# Patient Record
Sex: FEMALE | Race: WHITE | URBAN - METROPOLITAN AREA
[De-identification: names, ages, dates, MRNs, and addresses within clinical notes are randomized per-mention and may not be internally consistent; named-entity substitution may affect disease eponyms.]

---

## 2022-12-22 ENCOUNTER — OFFICE VISIT (OUTPATIENT)
Dept: URBAN - METROPOLITAN AREA CLINIC 24 | Facility: CLINIC | Age: 57
End: 2022-12-22
Payer: COMMERCIAL

## 2022-12-22 DIAGNOSIS — H43.813 VITREOUS DEGENERATION, BILATERAL: Primary | ICD-10-CM

## 2022-12-22 DIAGNOSIS — H33.021 RETINAL DETACHMENT WITH MULTIPLE BREAKS, RIGHT EYE: ICD-10-CM

## 2022-12-22 DIAGNOSIS — H25.13 AGE-RELATED NUCLEAR CATARACT, BILATERAL: ICD-10-CM

## 2022-12-22 PROCEDURE — 99204 OFFICE O/P NEW MOD 45 MIN: CPT | Performed by: OPHTHALMOLOGY

## 2022-12-22 PROCEDURE — 92134 CPTRZ OPH DX IMG PST SGM RTA: CPT | Performed by: OPHTHALMOLOGY

## 2022-12-22 ASSESSMENT — INTRAOCULAR PRESSURE
OD: 13
OS: 12

## 2022-12-22 NOTE — IMPRESSION/PLAN
Impression: Retinal detach Mac on OD Seen w Dr. Daphney Loza for [address]: RIGHT eye Mac on RD --  Detailed specialty exam of retina (incl scleral depressed exam) confirms retinal detachment (RD). Prompt Re-attachement surgery indicated and will be arranged. Even w successful surgery, risk of progressive or recur RD is est. 10-15%. URGED pt return immediately for repeat retinal eval if progressive/new symptoms (floaters, photopsias, field loss, etc). Vision will not recover to NML even w successful re-attachment. In some cases (1-5% est.)  retina NEVER re-attaches despite best efforts. Extensive education. All questions answered /understood. Proceed w RD repair surgx. ASAP mac on -- goal <24h PLAN VIT / Laser / OIL -- LIMB-THREATENING condition Post op plan [facility] -- [place] [address] [place]    Future will need VIT / OIL Removal.

## 2022-12-22 NOTE — IMPRESSION/PLAN
Impression: Age-related nuclear cataract, bilateral: H25.13. Plan: NOTE:  Pt understands that cataract will progress after vitrectomy surgery. May be in only few wks/mo, but can be delayed by over a year. Will address cataract thereafter w primary eye team PRN.

## 2022-12-27 ENCOUNTER — OFFICE VISIT (OUTPATIENT)
Dept: URBAN - METROPOLITAN AREA CLINIC 64 | Facility: CLINIC | Age: 57
End: 2022-12-27
Payer: COMMERCIAL

## 2022-12-27 DIAGNOSIS — H44.19 OTHER ENDOPHTHALMITIS: Primary | ICD-10-CM

## 2022-12-27 DIAGNOSIS — H33.021 RETINAL DETACHMENT WITH MULTIPLE BREAKS, RIGHT EYE: ICD-10-CM

## 2022-12-27 PROCEDURE — 99215 OFFICE O/P EST HI 40 MIN: CPT | Performed by: STUDENT IN AN ORGANIZED HEALTH CARE EDUCATION/TRAINING PROGRAM

## 2022-12-27 ASSESSMENT — INTRAOCULAR PRESSURE
OD: 25
OS: 14
OD: 29

## 2022-12-27 NOTE — IMPRESSION/PLAN
Impression: Other endophthalmitis: H44.19. Plan: s/p PPV for Mac on RD with SO fill on 12/23/22
layered hypopyon, interface haze throughout LASIK flap Emergent, refer to retina provider on call for evaluation today/surgery today. Discussed guarded prognosis with patient.

## 2022-12-27 NOTE — IMPRESSION/PLAN
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 Plan: s/p PPV, no view to posterior segment today due to overlying cornea, AC. Refer to retina.

## 2022-12-28 ENCOUNTER — POST-OPERATIVE VISIT (OUTPATIENT)
Dept: URBAN - METROPOLITAN AREA CLINIC 10 | Facility: CLINIC | Age: 57
End: 2022-12-28
Payer: COMMERCIAL

## 2022-12-28 DIAGNOSIS — Z48.810 ENCOUNTER FOR SURGICAL AFTERCARE FOLLOWING SURGERY ON A SENSE ORGAN: Primary | ICD-10-CM

## 2022-12-28 PROCEDURE — 99024 POSTOP FOLLOW-UP VISIT: CPT | Performed by: OPTOMETRIST

## 2022-12-28 RX ORDER — OFLOXACIN 3 MG/ML
0.3 % SOLUTION/ DROPS OPHTHALMIC
Qty: 1 | Refills: 1 | Status: ACTIVE
Start: 2022-12-28

## 2022-12-28 RX ORDER — PREDNISOLONE ACETATE 10 MG/ML
1 % SUSPENSION/ DROPS OPHTHALMIC
Qty: 1 | Refills: 1 | Status: ACTIVE
Start: 2022-12-28

## 2022-12-28 ASSESSMENT — INTRAOCULAR PRESSURE: OD: 16

## 2022-12-28 NOTE — IMPRESSION/PLAN
Impression: S/P Posterior Vitrectomy (PPVIT); Oil Removal 1K; Complex Cataract Extraction; Laser; Gas OD - 1 Day. Encounter for surgical aftercare following surgery on a sense organ  Z48.810. Plan: RTC 3 days to confirm [place]. Ofloxacin TID x 1 wk, Pred TID with weekly taper May start Atropine QD

## 2022-12-28 NOTE — IMPRESSION/PLAN
Impression:  Encounter for surgical aftercare following surgery on a sense organ  Z48.810. Excellent post op course   Post operative instructions reviewed - Plan: RTC as scheduled. Ofloxacin TID x 1 wk, Pred TID with weekly taper May start Atropine QD

## 2023-01-03 ENCOUNTER — POST-OPERATIVE VISIT (OUTPATIENT)
Dept: URBAN - METROPOLITAN AREA CLINIC 10 | Facility: CLINIC | Age: 58
End: 2023-01-03
Payer: COMMERCIAL

## 2023-01-03 DIAGNOSIS — Z48.810 ENCOUNTER FOR SURGICAL AFTERCARE FOLLOWING SURGERY ON A SENSE ORGAN: Primary | ICD-10-CM

## 2023-01-03 PROCEDURE — 99024 POSTOP FOLLOW-UP VISIT: CPT | Performed by: OPTOMETRIST

## 2023-01-03 ASSESSMENT — INTRAOCULAR PRESSURE
OS: 11
OD: 6

## 2023-01-06 ENCOUNTER — POST-OPERATIVE VISIT (OUTPATIENT)
Dept: URBAN - METROPOLITAN AREA CLINIC 10 | Facility: CLINIC | Age: 58
End: 2023-01-06
Payer: COMMERCIAL

## 2023-01-06 PROCEDURE — 99024 POSTOP FOLLOW-UP VISIT: CPT | Performed by: OPTOMETRIST

## 2023-01-06 ASSESSMENT — INTRAOCULAR PRESSURE
OD: 10
OS: 9

## 2023-01-06 NOTE — IMPRESSION/PLAN
Impression:  Encounter for surgical aftercare following surgery on a sense organ  Z48.810. Plan: Doing well. Gas bubble gone. AC clearing. No hypopyon. Pt. returning to [employer]. Cont. pred tid OD. RTC as scheduled next week c retina, Dr. Curtis Gruber as scheduled.

## 2023-01-10 ENCOUNTER — OFFICE VISIT (OUTPATIENT)
Dept: URBAN - METROPOLITAN AREA CLINIC 64 | Facility: CLINIC | Age: 58
End: 2023-01-10
Payer: COMMERCIAL

## 2023-01-10 DIAGNOSIS — H16.011 CORNEAL CORNEAL ULCER OF RIGHT EYE: ICD-10-CM

## 2023-01-10 DIAGNOSIS — H33.021 RETINAL DETACHMENT WITH MULTIPLE BREAKS, RIGHT EYE: Primary | ICD-10-CM

## 2023-01-10 PROCEDURE — 92134 CPTRZ OPH DX IMG PST SGM RTA: CPT | Performed by: OPHTHALMOLOGY

## 2023-01-10 PROCEDURE — 99024 POSTOP FOLLOW-UP VISIT: CPT | Performed by: OPHTHALMOLOGY

## 2023-01-10 ASSESSMENT — INTRAOCULAR PRESSURE
OS: 11
OD: 9

## 2023-01-10 NOTE — IMPRESSION/PLAN
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 Plan: Seemingly attached, gas bubble appears resolved. Doing well.

## 2023-01-11 ENCOUNTER — OFFICE VISIT (OUTPATIENT)
Dept: URBAN - METROPOLITAN AREA CLINIC 10 | Facility: CLINIC | Age: 58
End: 2023-01-11
Payer: COMMERCIAL

## 2023-01-11 DIAGNOSIS — H16.121 FILAMENTARY KERATITIS, RIGHT EYE: ICD-10-CM

## 2023-01-11 DIAGNOSIS — H18.231 SECONDARY CORNEAL EDEMA, RIGHT EYE: Primary | ICD-10-CM

## 2023-01-11 PROCEDURE — 99214 OFFICE O/P EST MOD 30 MIN: CPT | Performed by: OPHTHALMOLOGY

## 2023-01-11 NOTE — IMPRESSION/PLAN
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 Plan: Reassured pt. Very mild. Increase pred to TID weekly taper. 
Start Brian anjana qhs.

## 2023-01-11 NOTE — IMPRESSION/PLAN
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 Plan: Filaments debrided with Q-tip and forceps. No ulcer seen, large mucous filament attached to cornea and loose suture- removed. Start PFATs ATC Use oflox TID x 1 week

## 2023-01-11 NOTE — IMPRESSION/PLAN
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  Plan: s/p repair, under the care of Juju Clemente

## 2023-01-17 ENCOUNTER — OFFICE VISIT (OUTPATIENT)
Dept: URBAN - METROPOLITAN AREA CLINIC 64 | Facility: CLINIC | Age: 58
End: 2023-01-17
Payer: COMMERCIAL

## 2023-01-17 DIAGNOSIS — H18.231 SECONDARY CORNEAL EDEMA, RIGHT EYE: ICD-10-CM

## 2023-01-17 DIAGNOSIS — H33.021 RETINAL DETACHMENT WITH MULTIPLE BREAKS, RIGHT EYE: ICD-10-CM

## 2023-01-17 DIAGNOSIS — H16.121 FILAMENTARY KERATITIS, RIGHT EYE: Primary | ICD-10-CM

## 2023-01-17 PROCEDURE — 99213 OFFICE O/P EST LOW 20 MIN: CPT

## 2023-01-17 RX ORDER — PREDNISOLONE ACETATE 10 MG/ML
1 % SUSPENSION/ DROPS OPHTHALMIC
Qty: 1 | Refills: 1 | Status: ACTIVE
Start: 2023-01-17

## 2023-01-17 ASSESSMENT — INTRAOCULAR PRESSURE: OD: 7

## 2023-01-17 NOTE — IMPRESSION/PLAN
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 Plan: Continue Brian 128 BID OD, Will start predforte QID OD x1 week then taper 3,2,1 weekly. Edema appears the same since CO with Dr. Maricarmen Cary.  will have patient continue care with . RTC for cornea FU 2 weeks.

## 2023-01-17 NOTE — IMPRESSION/PLAN
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 Plan: Small conj cyst OD, assured patient. Continue PFATs q1hr - see other plan.

## 2023-01-17 NOTE — IMPRESSION/PLAN
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 Plan: Appears attached but hazy view due to corneal edema. FU as scheduled with Dr. Sunitha Thurman.

## 2023-01-30 ENCOUNTER — OFFICE VISIT (OUTPATIENT)
Dept: URBAN - METROPOLITAN AREA CLINIC 64 | Facility: CLINIC | Age: 58
End: 2023-01-30
Payer: COMMERCIAL

## 2023-01-30 DIAGNOSIS — H33.021 RETINAL DETACHMENT WITH MULTIPLE BREAKS, RIGHT EYE: ICD-10-CM

## 2023-01-30 DIAGNOSIS — H16.011 CORNEAL CORNEAL ULCER OF RIGHT EYE: Primary | ICD-10-CM

## 2023-01-30 PROCEDURE — 92134 CPTRZ OPH DX IMG PST SGM RTA: CPT | Performed by: OPHTHALMOLOGY

## 2023-01-30 PROCEDURE — 99214 OFFICE O/P EST MOD 30 MIN: CPT | Performed by: OPHTHALMOLOGY

## 2023-01-30 ASSESSMENT — INTRAOCULAR PRESSURE
OD: 12
OS: 17

## 2023-01-30 NOTE — IMPRESSION/PLAN
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 Plan: Continue f/u w/ Dr Samantha Louis on Thursday and Dr Alvaro Meneses and Dr Gala Adhikari as scheduled.  CPM.

## 2023-01-30 NOTE — IMPRESSION/PLAN
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 Plan: Stable, observe. Doing well. RTC 4 weeks, hopefully the view will improve somewhat by then but retina appears well-attached at this time.

## 2023-02-02 ENCOUNTER — OFFICE VISIT (OUTPATIENT)
Dept: URBAN - METROPOLITAN AREA CLINIC 64 | Facility: CLINIC | Age: 58
End: 2023-02-02
Payer: COMMERCIAL

## 2023-02-02 DIAGNOSIS — H44.19 OTHER ENDOPHTHALMITIS: ICD-10-CM

## 2023-02-02 DIAGNOSIS — H18.231 SECONDARY CORNEAL EDEMA, RIGHT EYE: Primary | ICD-10-CM

## 2023-02-02 PROCEDURE — 99213 OFFICE O/P EST LOW 20 MIN: CPT | Performed by: STUDENT IN AN ORGANIZED HEALTH CARE EDUCATION/TRAINING PROGRAM

## 2023-02-02 ASSESSMENT — INTRAOCULAR PRESSURE
OS: 13
OD: 12

## 2023-02-02 NOTE — IMPRESSION/PLAN
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 Plan: LASIK flap with superior circumferential haze, ? post inflammatory changes/scarring
-No apparent ulcer or infiltrate
-Rec Continuing Brian 128 increase to BID
-Continue Ofloxacin TID
-Continue pF taper over this next month Follow up 1 week for any changes

## 2023-02-02 NOTE — IMPRESSION/PLAN
Impression: Other endophthalmitis: H44.19.  Plan: Resolved s/p SO removal with intra Vit Abx
-Continue all care with DR Curran Sep today appears attached but poor view due to K

## 2023-02-07 ENCOUNTER — OFFICE VISIT (OUTPATIENT)
Dept: URBAN - METROPOLITAN AREA CLINIC 64 | Facility: CLINIC | Age: 58
End: 2023-02-07
Payer: COMMERCIAL

## 2023-02-07 DIAGNOSIS — H18.231 SECONDARY CORNEAL EDEMA, RIGHT EYE: Primary | ICD-10-CM

## 2023-02-07 PROCEDURE — 99024 POSTOP FOLLOW-UP VISIT: CPT | Performed by: STUDENT IN AN ORGANIZED HEALTH CARE EDUCATION/TRAINING PROGRAM

## 2023-02-07 ASSESSMENT — INTRAOCULAR PRESSURE: OS: 15

## 2023-02-07 NOTE — IMPRESSION/PLAN
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 Plan: LASIK flap with superior circumferential haze, ? post inflammatory changes/scarring
-No apparent ulcer or infiltrate
-Continue Brian 128 BID 
-Stop Ofloxacin TID
-Continue pF taper over this next month Follow up 1 week for any changes

## 2023-02-17 ENCOUNTER — OFFICE VISIT (OUTPATIENT)
Dept: URBAN - METROPOLITAN AREA CLINIC 64 | Facility: CLINIC | Age: 58
End: 2023-02-17
Payer: COMMERCIAL

## 2023-02-17 DIAGNOSIS — H18.231 SECONDARY CORNEAL EDEMA, RIGHT EYE: Primary | ICD-10-CM

## 2023-02-17 PROCEDURE — 99024 POSTOP FOLLOW-UP VISIT: CPT | Performed by: STUDENT IN AN ORGANIZED HEALTH CARE EDUCATION/TRAINING PROGRAM

## 2023-02-17 RX ORDER — ERYTHROMYCIN 5 MG/G
OINTMENT OPHTHALMIC
Qty: 1 | Refills: 1 | Status: ACTIVE
Start: 2023-02-17

## 2023-02-17 ASSESSMENT — INTRAOCULAR PRESSURE: OS: 14

## 2023-02-17 NOTE — IMPRESSION/PLAN
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 Plan: LASIK flap with superior circumferential haze, ? post inflammatory changes/scarring
-No apparent ulcer or infiltrate
-DC Brian 128 BID 
-Continue pF taper over this next month, currently BID OD
-filaments removed at slit lamp
-RX erythromycin anjana TID to Q1hr OD Follow up 1 week for any changes

## 2023-02-24 ENCOUNTER — OFFICE VISIT (OUTPATIENT)
Dept: URBAN - METROPOLITAN AREA CLINIC 64 | Facility: CLINIC | Age: 58
End: 2023-02-24
Payer: COMMERCIAL

## 2023-02-24 DIAGNOSIS — H18.231 SECONDARY CORNEAL EDEMA, RIGHT EYE: Primary | ICD-10-CM

## 2023-02-24 PROCEDURE — 99024 POSTOP FOLLOW-UP VISIT: CPT

## 2023-02-24 ASSESSMENT — INTRAOCULAR PRESSURE
OS: 12
OD: 8

## 2023-02-24 NOTE — IMPRESSION/PLAN
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 Plan: LASIK flap with superior circumferential haze. Continue PF taper, QD OD and erythromycin anjana TID to Q1hr ODx 1 week, then DC. No apparent ulcer or infiltrate. Follow up 1 month for any changes with Dr. Nakita Musa.

## 2023-02-27 ENCOUNTER — OFFICE VISIT (OUTPATIENT)
Dept: URBAN - METROPOLITAN AREA CLINIC 64 | Facility: CLINIC | Age: 58
End: 2023-02-27
Payer: COMMERCIAL

## 2023-02-27 DIAGNOSIS — H33.021 RETINAL DETACHMENT WITH MULTIPLE BREAKS, RIGHT EYE: Primary | ICD-10-CM

## 2023-02-27 PROCEDURE — 92134 CPTRZ OPH DX IMG PST SGM RTA: CPT | Performed by: OPHTHALMOLOGY

## 2023-02-27 PROCEDURE — 99024 POSTOP FOLLOW-UP VISIT: CPT | Performed by: OPHTHALMOLOGY

## 2023-02-27 ASSESSMENT — INTRAOCULAR PRESSURE
OD: 9
OS: 11

## 2023-02-27 NOTE — IMPRESSION/PLAN
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 Plan: Stable, observe. Doing well. View into posterior segment much improved. RD precautions discussed. PRN with me. Continue f/u w/ anterior segment docs going forward.

## 2023-03-24 ENCOUNTER — OFFICE VISIT (OUTPATIENT)
Dept: URBAN - METROPOLITAN AREA CLINIC 64 | Facility: CLINIC | Age: 58
End: 2023-03-24
Payer: COMMERCIAL

## 2023-03-24 DIAGNOSIS — H44.19 OTHER ENDOPHTHALMITIS: ICD-10-CM

## 2023-03-24 DIAGNOSIS — H17.11 CENTRAL CORNEAL OPACITY OF RIGHT EYE: Primary | ICD-10-CM

## 2023-03-24 PROCEDURE — 99024 POSTOP FOLLOW-UP VISIT: CPT | Performed by: STUDENT IN AN ORGANIZED HEALTH CARE EDUCATION/TRAINING PROGRAM

## 2023-03-24 RX ORDER — PREDNISOLONE ACETATE 10 MG/ML
1 % SUSPENSION/ DROPS OPHTHALMIC
Qty: 1 | Refills: 1 | Status: ACTIVE
Start: 2023-03-24

## 2023-03-24 ASSESSMENT — INTRAOCULAR PRESSURE
OD: 8
OS: 11

## 2023-03-24 NOTE — IMPRESSION/PLAN
Impression: Other endophthalmitis: H44.19. Plan: Resolved s/p SO removal with intra Vit Abx
-Continue all care with DR Hummel Ou

## 2023-03-24 NOTE — IMPRESSION/PLAN
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 Plan: s/p endophthalmitis and possible K infiltrate, now appears resolved but may have some inflammatory debris in the interface
-Trial of repeat prednisolone acetate 4/3/2/1 over the next month and follow up after wards
-may need a flap lift there after if no imprvoement 
-Trial of contact lens at outside doc office without improvement

## 2023-04-27 ENCOUNTER — OFFICE VISIT (OUTPATIENT)
Dept: URBAN - METROPOLITAN AREA CLINIC 64 | Facility: CLINIC | Age: 58
End: 2023-04-27
Payer: COMMERCIAL

## 2023-04-27 DIAGNOSIS — H17.11 CENTRAL CORNEAL OPACITY OF RIGHT EYE: Primary | ICD-10-CM

## 2023-04-27 DIAGNOSIS — H44.19 OTHER ENDOPHTHALMITIS: ICD-10-CM

## 2023-04-27 PROCEDURE — 99213 OFFICE O/P EST LOW 20 MIN: CPT | Performed by: STUDENT IN AN ORGANIZED HEALTH CARE EDUCATION/TRAINING PROGRAM

## 2023-04-27 ASSESSMENT — INTRAOCULAR PRESSURE
OD: 8
OS: 11

## 2023-04-27 NOTE — IMPRESSION/PLAN
Impression: Other endophthalmitis: H44.19.  Plan: Resolved s/p SO removal with intra Vit Abx
-Continue all care with DR Vernon Farah

## 2023-04-27 NOTE — IMPRESSION/PLAN
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  Plan: s/p endophthalmitis and possible K infiltrate, now appears resolved but may have some inflammatory debris in the interface
-Trial of repeat prednisolone acetate 4/3/2/1 without improvement --> refer back to Dr Penn Lute again for further eval
-Would not recommend flap lift with level of KNV

## 2023-05-30 ENCOUNTER — OFFICE VISIT (OUTPATIENT)
Dept: URBAN - METROPOLITAN AREA CLINIC 10 | Facility: CLINIC | Age: 58
End: 2023-05-30
Payer: COMMERCIAL

## 2023-05-30 DIAGNOSIS — H20.11 CHRONIC IRIDOCYCLITIS, RIGHT EYE: ICD-10-CM

## 2023-05-30 DIAGNOSIS — H17.11 CENTRAL CORNEAL OPACITY OF RIGHT EYE: ICD-10-CM

## 2023-05-30 DIAGNOSIS — H18.231 SECONDARY CORNEAL EDEMA, RIGHT EYE: ICD-10-CM

## 2023-05-30 DIAGNOSIS — H16.141 PUNCTATE KERATITIS, RIGHT EYE: Primary | ICD-10-CM

## 2023-05-30 PROCEDURE — 99213 OFFICE O/P EST LOW 20 MIN: CPT | Performed by: OPHTHALMOLOGY

## 2023-05-30 RX ORDER — DIFLUPREDNATE OPHTHALMIC 0.5 MG/ML
0.05 % EMULSION OPHTHALMIC
Qty: 5 | Refills: 3 | Status: ACTIVE
Start: 2023-05-30

## 2023-05-30 ASSESSMENT — INTRAOCULAR PRESSURE
OS: 11
OD: 9

## 2023-05-30 NOTE — IMPRESSION/PLAN
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 Plan: Stable edema; explained to pt that corneal transplantation unlikely to improve vision. No corneal surgery recommended.

## 2023-05-30 NOTE — IMPRESSION/PLAN
Impression: Punctate keratitis, right eye: H16.141.
- Several risk factors for keratitis including persistent inflammation. h/o LASIK, several surgeries. - PP in place RUL and RLL 0.4 ultraplug Plan: Much improved! Filaments resolved Also with persistent AC rxn noted. Explained to pt that goal of ocular surface tx is to optimize comfort, visual potential guarded. Cont PFATs q1hr w/a Cont Erythro anjana BID. Cont ASEDs 20% QID. Cont LS/WC.

## 2023-05-30 NOTE — IMPRESSION/PLAN
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 Plan: No resolution on several rounds of prednisolone --  needs a stronger steroid gtt. Lotemax /FML/ Flarex would not be strong enough. Start Difluprednate or dexamethasone QID x 2 weeks then TID x 2 weeks then BID, hold at this dose OD.

## 2023-05-30 NOTE — IMPRESSION/PLAN
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  Plan: Renu of epi in growth noted, would not recommend flap lift as it is unlikely to be causing diffuse keratitis of the entire cornea and will likely not improve vision

## 2023-06-27 ENCOUNTER — OFFICE VISIT (OUTPATIENT)
Dept: URBAN - METROPOLITAN AREA CLINIC 10 | Facility: CLINIC | Age: 58
End: 2023-06-27
Payer: COMMERCIAL

## 2023-06-27 DIAGNOSIS — H16.141 PUNCTATE KERATITIS, RIGHT EYE: Primary | ICD-10-CM

## 2023-06-27 DIAGNOSIS — H44.19 OTHER ENDOPHTHALMITIS: ICD-10-CM

## 2023-06-27 DIAGNOSIS — H20.11 CHRONIC IRIDOCYCLITIS, RIGHT EYE: ICD-10-CM

## 2023-06-27 DIAGNOSIS — H17.11 CENTRAL CORNEAL OPACITY OF RIGHT EYE: ICD-10-CM

## 2023-06-27 PROCEDURE — 99213 OFFICE O/P EST LOW 20 MIN: CPT | Performed by: OPHTHALMOLOGY

## 2023-06-27 ASSESSMENT — INTRAOCULAR PRESSURE
OS: 12
OD: 9

## 2023-06-27 NOTE — IMPRESSION/PLAN
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 Plan: Given poor guarded visual prognosis, will defer PKP unless if improved view is desired by retina service.

## 2023-06-27 NOTE — IMPRESSION/PLAN
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 Plan: Improved. Cont Difluprednate or dexamethasone BID OD. Will have pt return to [facility] for continued care.

## 2023-06-27 NOTE — IMPRESSION/PLAN
Impression: Other endophthalmitis: H44.19.  Plan: Resolved s/p SO removal with intra Vit Abx
-Continue all care with [facility] eye care

## 2023-06-27 NOTE — IMPRESSION/PLAN
Impression: Punctate keratitis, right eye: H16.141.
- Several risk factors for keratitis including persistent inflammation. h/o LASIK, several surgeries. - PP in place RUL and RLL 0.4 ultraplug Plan: Much improved! Filaments resolved Cont PFATs q1-2hr w/a Cont Erythro anjana BID. Cont ASEDs 20% QID. Cont LS/WC. Given improvement of ocular surface, will transfer care back to [facility], cornea prn.

## 2023-07-21 ENCOUNTER — OFFICE VISIT (OUTPATIENT)
Dept: URBAN - METROPOLITAN AREA CLINIC 64 | Facility: LOCATION | Age: 58
End: 2023-07-21
Payer: COMMERCIAL

## 2023-07-21 DIAGNOSIS — H16.141 PUNCTATE KERATITIS, RIGHT EYE: Primary | ICD-10-CM

## 2023-07-21 DIAGNOSIS — H17.11 CENTRAL CORNEAL OPACITY OF RIGHT EYE: ICD-10-CM

## 2023-07-21 PROCEDURE — 99213 OFFICE O/P EST LOW 20 MIN: CPT | Performed by: STUDENT IN AN ORGANIZED HEALTH CARE EDUCATION/TRAINING PROGRAM

## 2023-07-21 ASSESSMENT — INTRAOCULAR PRESSURE
OD: 8
OS: 6

## 2023-07-21 NOTE — IMPRESSION/PLAN
Impression: Punctate keratitis, right eye: H16.141.
- Several risk factors for keratitis including persistent inflammation. h/o LASIK, several surgeries. - PP in place RUL and RLL 0.4 ultraplug Plan: Stable, Continue all drops as recommended by Dr. Melecio David. Cont PFATs q1-2hr, Erythro anjana BID, ASEDs 20% QID - serum tears, Taper durezol QD x1mo. RTC 1mo for IOP check.

## 2023-09-01 ENCOUNTER — OFFICE VISIT (OUTPATIENT)
Dept: URBAN - METROPOLITAN AREA CLINIC 64 | Facility: LOCATION | Age: 58
End: 2023-09-01
Payer: COMMERCIAL

## 2023-09-01 DIAGNOSIS — H44.19 OTHER ENDOPHTHALMITIS: ICD-10-CM

## 2023-09-01 DIAGNOSIS — H16.011 CORNEAL CORNEAL ULCER OF RIGHT EYE: Primary | ICD-10-CM

## 2023-09-01 PROCEDURE — 99213 OFFICE O/P EST LOW 20 MIN: CPT | Performed by: STUDENT IN AN ORGANIZED HEALTH CARE EDUCATION/TRAINING PROGRAM

## 2023-09-01 ASSESSMENT — INTRAOCULAR PRESSURE
OS: 12
OD: 10